# Patient Record
Sex: MALE | Race: WHITE | Employment: UNEMPLOYED | URBAN - METROPOLITAN AREA
[De-identification: names, ages, dates, MRNs, and addresses within clinical notes are randomized per-mention and may not be internally consistent; named-entity substitution may affect disease eponyms.]

---

## 2020-06-17 ENCOUNTER — HOSPITAL ENCOUNTER (OUTPATIENT)
Age: 20
Discharge: HOME | End: 2020-06-17
Payer: COMMERCIAL

## 2020-06-17 LAB
HBV SURFACE AB TITR SER: <3.5 MIU/ML
RUBELLA ANTIBODY IGG: 89.9 IU/ML

## 2020-06-17 PROCEDURE — 86735 MUMPS ANTIBODY: CPT

## 2020-06-17 PROCEDURE — 86706 HEP B SURFACE ANTIBODY: CPT

## 2020-06-17 PROCEDURE — 86787 VARICELLA-ZOSTER ANTIBODY: CPT

## 2020-06-17 PROCEDURE — 86762 RUBELLA ANTIBODY: CPT

## 2020-06-17 PROCEDURE — 86765 RUBEOLA ANTIBODY: CPT

## 2020-06-17 PROCEDURE — 36415 COLL VENOUS BLD VENIPUNCTURE: CPT

## 2020-06-18 LAB
MEASLES IMMUNE (IGG): NORMAL
MUMPS AB IGG: NORMAL
VARICELLA-ZOSTER VIRUS AB, IGG: NORMAL

## 2021-06-07 ENCOUNTER — OFFICE VISIT (OUTPATIENT)
Dept: ORTHOPEDIC SURGERY | Age: 21
End: 2021-06-07
Payer: COMMERCIAL

## 2021-06-07 VITALS — WEIGHT: 208 LBS | HEIGHT: 72 IN | BODY MASS INDEX: 28.17 KG/M2

## 2021-06-07 DIAGNOSIS — S83.242A TEAR OF MEDIAL MENISCUS OF LEFT KNEE, UNSPECIFIED TEAR TYPE, UNSPECIFIED WHETHER OLD OR CURRENT TEAR, INITIAL ENCOUNTER: Primary | ICD-10-CM

## 2021-06-07 DIAGNOSIS — M25.562 ACUTE PAIN OF LEFT KNEE: ICD-10-CM

## 2021-06-07 PROCEDURE — 99203 OFFICE O/P NEW LOW 30 MIN: CPT | Performed by: PHYSICIAN ASSISTANT

## 2021-06-07 RX ORDER — IBUPROFEN 200 MG
200 TABLET ORAL EVERY 6 HOURS PRN
COMMUNITY

## 2021-06-07 NOTE — PROGRESS NOTES
Patient Name:David Melgar  Medical Record Number: [id number]  YOB: 2000  Date of Encounter: 6/7/2021     Chief Complaint   Patient presents with    Knee Pain     Left knee pain x3 weeks. No known injury. History of Present Illness:  Oneil Gallegos is a 21 y.o. male here for evaluation of his left knee. His pain assessment is documented below and I reviewed this with him today. Patient states he began having left knee pain 3 weeks ago without any specific injury. He has been trying to become more active by getting out and walking more. He has also been doing some \"long boarding\". Patient states he injured his left knee while playing football his freshman year of high school and did not seek medical attention. He really did not have any left knee problems following that. He denies having any left knee swelling, redness or warmth. He denies locking or catching. He does have moderate grinding behind his kneecap. Most of his pain today is along the posterior aspect of his knee. Pain Assessment  Location of Pain: Knee  Location Modifiers: Left  Severity of Pain: 7  Quality of Pain: Other (Comment), Cracking (Stabbing)  Duration of Pain: Persistent  Frequency of Pain: Intermittent  Date Pain First Started:  (x3 weeks)  Aggravating Factors: Walking  Limiting Behavior: Yes  Relieving Factors: Rest, Nsaids  Result of Injury: No  Work-Related Injury: No  Are there other pain locations you wish to document?: No    History reviewed. No pertinent past medical history. Past Surgical History:   Procedure Laterality Date    ADENOIDECTOMY      TYMPANOSTOMY TUBE PLACEMENT  x5       Current Outpatient Medications   Medication Sig Dispense Refill    ibuprofen (ADVIL;MOTRIN) 200 MG tablet Take 200 mg by mouth every 6 hours as needed for Pain      fluticasone (FLONASE) 50 MCG/ACT nasal spray 2 sprays in each nostril daily.  (Patient not taking: Reported on 6/7/2021) 1 Bottle 2    fexofenadine (ALLEGRA) 180 MG tablet Take 1 tablet by mouth daily (Patient not taking: Reported on 6/7/2021) 30 tablet 2     No current facility-administered medications for this visit. Allergies, social and family histories were reviewed and updated as appropriate. Review of Systems:  Relevant review of systems reviewed and available in the patient's chart under the 'MEDIA' tab. Vital Signs:  Ht 6' (1.829 m)   Wt 208 lb (94.3 kg)   BMI 28.21 kg/m²     General Exam:   Constitutional: Patient is adequately groomed with no evidence of malnutrition  Mental Status: The patient is oriented to time, place and person. The patient's mood and affect are appropriate. Lymphatic: The lymphatic examination bilaterally reveals all areas to be without enlargement or induration. Neurological: The patient has good coordination and balance. There is no focal weakness or sensory deficit. Left Knee Examination:    Inspection: Normal muscle contours and no significant limb length discrepancy. No gross atrophy in any particular myotome. There is no obvious swelling or joint effusion of the knee. There are no abrasions, lacerations, contusions, hematomas or ecchymosis. There is no erythema, induration or warmth to suggest an infectious process. Palpation: Patient denies tenderness on palpation of the left knee. He does have a moderate patellofemoral crepitus. Range of Motion:    Flexion: 140°   Extension: 0°    Strength:  Quad 5 / 5  ; Hamstrings 5 / 5. Gross motor to hip and ankle intact    Special Tests:    Lachman (ACL) - negative   Posterior Lachman (PCL) - negative    Anterior Drawer (ACL) - negative   Posterior Drawer (PCL) - negative   Pivot shift (ACL) - negative    Posterior sag (PCL) - negative   Amarjit's Test (Meniscus) - negative   Homans Test (Thrombophlebitis)- negative   Does not open to valgus or varus stress at 0 or 30° (MCL/LCL)    Skin: There are no rashes, ulcerations or lesions.  Sensation to light touch intact. Circulation: The limb is warm and well perfused. Distal pulses intact. Capillary refill is intact. Edema: none. Venous stasis changes: none. Gait: Patient with normal tandem gait without limp. Normal strides     Radiology:  X-rays obtained today and reviewed in office:  Views: 3 view left knee including AP, lateral and sunrise views  Impression: No evidence for acute fracture or subluxation / dislocation. There are no loose bodies appreciated. There is no evidence of tibiofemoral subluxation. Impression:  Encounter Diagnoses   Name Primary?  Tear of medial meniscus of left knee, unspecified tear type, unspecified whether old or current tear, initial encounter Yes    Acute pain of left knee        Office Procedures:  Orders Placed This Encounter   Procedures    XR KNEE BILATERAL STANDING     Standing Status:   Future     Number of Occurrences:   1     Standing Expiration Date:   6/7/2022     Order Specific Question:   Reason for exam:     Answer:   Knee Pain    XR KNEE LEFT (1-2 VIEWS)     2V Lt Knee     Standing Status:   Future     Number of Occurrences:   1     Standing Expiration Date:   6/7/2022     Order Specific Question:   Reason for exam:     Answer:   Knee Pain    External Referral To Physical Therapy     Referral Priority:   Routine     Referral Type:   Eval and Treat     Referral Reason:   Specialty Services Required     Requested Specialty:   Physical Therapy     Number of Visits Requested:   1       Treatment Plan:          I am unsure as to the exact etiology of patient's left knee pain. This very well could be an acute exacerbation of an old posterior horn medial meniscus tear. He has no left knee swelling or joint effusion. He has great range of motion and strength of the left knee. Special testing is negative including Amarjit's testing. I feel patient will benefit most from working with physical therapy at this time.   If left knee pain worsens or fails to improve with physical therapy he will likely need an MRI to further evaluate. Patient is advised to avoid high impact or strenuous activity at this time. He will follow-up after 4 to 6 weeks of physical therapy. David Braggmady Goldberg was informed of the results of any imaging. We discussed treatment options and a time was given to answer questions. A plan was proposed and Elli Pacheco understand and accepts this course of care. Electronically signed by nIga Dawkins PA-C on 3/1/2857  Board Certified [facility]    Please note that portions of this note were completed with a voice recognition program.  Efforts were made to edit the dictations but occasionally words are mis-transcribed.

## 2022-01-05 ENCOUNTER — OFFICE VISIT (OUTPATIENT)
Dept: ENT CLINIC | Age: 22
End: 2022-01-05
Payer: COMMERCIAL

## 2022-01-05 VITALS — SYSTOLIC BLOOD PRESSURE: 136 MMHG | DIASTOLIC BLOOD PRESSURE: 82 MMHG | HEART RATE: 101 BPM | TEMPERATURE: 97.1 F

## 2022-01-05 DIAGNOSIS — R07.0 THROAT PAIN IN ADULT: ICD-10-CM

## 2022-01-05 DIAGNOSIS — J02.9 ACUTE PHARYNGITIS, UNSPECIFIED ETIOLOGY: Primary | ICD-10-CM

## 2022-01-05 DIAGNOSIS — J03.90 ACUTE TONSILLITIS, UNSPECIFIED ETIOLOGY: ICD-10-CM

## 2022-01-05 PROCEDURE — 99204 OFFICE O/P NEW MOD 45 MIN: CPT | Performed by: OTOLARYNGOLOGY

## 2022-01-05 RX ORDER — HYDROCODONE BITARTRATE AND ACETAMINOPHEN 5; 325 MG/1; MG/1
1 TABLET ORAL EVERY 4 HOURS PRN
Qty: 36 TABLET | Refills: 0 | Status: SHIPPED | OUTPATIENT
Start: 2022-01-05 | End: 2022-01-11

## 2022-01-05 ASSESSMENT — ENCOUNTER SYMPTOMS
RHINORRHEA: 0
SORE THROAT: 1
SINUS PAIN: 0
VOICE CHANGE: 1
FACIAL SWELLING: 0
TROUBLE SWALLOWING: 1

## 2022-01-05 NOTE — PROGRESS NOTES
[facility]       \"NEW\"  PATIENT VISIT   (ESTABLISHED, who has not been seen by a [facility] Otolaryngologist in over 3 years)       PCP:  No primary care provider on file. REFERRED BY:   Self       CHIEF COMPLAINT  Chief Complaint   Patient presents with    Other     infected throat hard to breathe       HISTORY OF PRESENT ILLNESS       Apple Merida is a 24 y.o. male here for evaluation and treatment of sore throat and upper respiratory infection. He was here with his mother, Emmanuel. His mother reported that his throat is very infected with white spots. \"My throat is swollen, and it is hard to breathe, mostly at night when I'm laying flat. \"  He was evaluated by a telehealth [facility] and was prescribed Augmentin, about 48 hours ago. In the past, he has a had time with steroid therapy due to anxiety. Per mother: \"On Monday his throat looked like a regular strep throat. Then yesterday, he sent me a picture and looked like a peritonsillar abscess. So got into pcp last night and gave him some magic mouthwash. Helped slilghtly. Patient said \"it doesn't really help and today it looked even worse when I looked in there. Had a throat infection october 2021 and not in past  Past three years no strep throat, tonsillitis, no peritonsillar abscess. REVIEW OF SYSTEMS   Review of Systems   Constitutional: Positive for chills and fever. Negative for unexpected weight change. HENT: Positive for postnasal drip, sore throat, trouble swallowing and voice change. Negative for congestion, ear discharge, ear pain (a little in the left ear just today), facial swelling, hearing loss, mouth sores, nosebleeds, rhinorrhea, sinus pain and tinnitus. Neurological: Negative for dizziness and speech difficulty. PAST MEDICAL HISTORY    No past medical history on file.       Past Surgical History:   Procedure Laterality Date    ADENOIDECTOMY  TYMPANOSTOMY TUBE PLACEMENT  x5         EXAMINATION    Vitals:    01/05/22 1405   BP: 136/82   Site: Right Upper Arm   Position: Sitting   Cuff Size: Medium Adult   Pulse: 101   Temp: 97.1 °F (36.2 °C)   TempSrc: Temporal       Physical Exam  Vitals reviewed. Constitutional:       General: He is awake. He is not in acute distress. Appearance: Normal appearance. He is well-developed. He is not ill-appearing or toxic-appearing. HENT:      Head: Normocephalic and atraumatic. Salivary Glands: Right salivary gland is not diffusely enlarged or tender. Left salivary gland is not diffusely enlarged or tender. Right Ear: Tympanic membrane, ear canal and external ear normal.      Left Ear: Tympanic membrane, ear canal and external ear normal.      Nose: Mucosal edema, congestion and rhinorrhea present. No nasal deformity or septal deviation. Right Turbinates: Swollen. Not enlarged. Left Turbinates: Swollen. Not enlarged. Right Sinus: No maxillary sinus tenderness or frontal sinus tenderness. Left Sinus: No maxillary sinus tenderness or frontal sinus tenderness. Mouth/Throat:      Lips: Pink. No lesions. Mouth: Mucous membranes are moist. No oral lesions. Tongue: No lesions. Palate: No mass and lesions. Pharynx: Uvula midline. Pharyngeal swelling, oropharyngeal exudate, posterior oropharyngeal erythema and uvula swelling present. Tonsils: Tonsillar exudate present. No tonsillar abscesses. 3+ on the right. 3+ on the left. Comments: Tonsils were 3+ enlarged, erythematous and edematous, symmetric, and  with membrano-follicular purulent exudate. Oropharyngeal airway was patent. There was no trismus or hot potato voice and no evidence of peritonsillar abscess. Neck:      Thyroid: No thyroid mass, thyromegaly or thyroid tenderness. Trachea: No tracheal deviation. Comments: No cervical masses.   Musculoskeletal:      Cervical back: Normal range of motion and neck supple. Lymphadenopathy:      Cervical: Cervical adenopathy (bilateral tender anterior cervical lymphadenopathy) present. Neurological:      Mental Status: He is alert. Kelton Ware / Mortimer Debar / Nereyda Snowden was seen today for other. Diagnoses and all orders for this visit:    Acute pharyngitis, unspecified etiology  -     CBC WITH AUTO DIFFERENTIAL; Future  -     MONONUCLEOSIS SCREEN; Future  -     COVID-19  -     HYDROcodone-acetaminophen (NORCO) 5-325 MG per tablet; Take 1 tablet by mouth every 4 hours as needed for Pain for up to 6 days. Intended supply: 6 days. Take lowest dose possible to manage pain    Acute tonsillitis, unspecified etiology  -     CBC WITH AUTO DIFFERENTIAL; Future  -     MONONUCLEOSIS SCREEN; Future  -     COVID-19  -     HYDROcodone-acetaminophen (NORCO) 5-325 MG per tablet; Take 1 tablet by mouth every 4 hours as needed for Pain for up to 6 days. Intended supply: 6 days. Take lowest dose possible to manage pain    Throat pain in adult  -     HYDROcodone-acetaminophen (NORCO) 5-325 MG per tablet; Take 1 tablet by mouth every 4 hours as needed for Pain for up to 6 days. Intended supply: 6 days. Take lowest dose possible to manage pain    Other orders  -     COVID-19  -     COVID-19  -     COVID-19             RECOMMENDATIONS/PLAN      1. Acetaminophen (eg. Tylenol) or Ibuprofen (eg. Advil) (over the counter medications) as needed for pain. 2. Return if symptoms worsen or fail to improve and clear with treatment, or, for any further ENT or sinus problems or symptoms. Patient Instructions   RHINOSINUSITIS AND URI CARE  · You may use acetaminophen (eg. Tylenol) or Ibuprofen (eg. Advil) (over the counter medications) as needed for fever or pain. · Take Probiotic while you are taking antibiotics, to prevent diarrhea, stomach upset, pseudomembranous colitis, and C. difficile diarrhea.   This may be obtained at your pharmacy or [employer] store. Alternatively, you may eat one cup of yogurt with active or live cultures twice daily while taking the antibiotic. Continue for two to three days after completion of the antibiotic. · Use a 12 hour decongestant spray, 0.05% oxymetazoline (e.g. Afrin, Duration, 4-Way). Spray each nostril twice two times a day for 6 as needed. · Take one Mucinex-D (red orange box) maximum strength tablet each morning and one Mucinex (blue box) maximum strength tablet each evening, about 12 hours later, daily for the next ten days. Take only is approved by your PCP regarding high blood pressure. · A steam inhaler mask device may be helpful. These can be purchased at your pharmacy. · It may take several days to several weeks for your sinusitis to clear up. It is important to take all your medications as prescribed. Please continue your antibiotics as prescribed. · Please call the office if your condition worsens or if symptoms persist after treatment is completed.

## 2022-01-05 NOTE — PATIENT INSTRUCTIONS
RHINOSINUSITIS AND URI CARE  · You may use acetaminophen (eg. Tylenol) or Ibuprofen (eg. Advil) (over the counter medications) as needed for fever or pain. · Take Probiotic while you are taking antibiotics, to prevent diarrhea, stomach upset, pseudomembranous colitis, and C. difficile diarrhea. This may be obtained at your pharmacy or health food store. Alternatively, you may eat one cup of yogurt with active or live cultures twice daily while taking the antibiotic. Continue for two to three days after completion of the antibiotic. · Use a 12 hour decongestant spray, 0.05% oxymetazoline (e.g. Afrin, Duration, 4-Way). Spray each nostril twice two times a day for 6 as needed. · Take one Mucinex-D (red orange box) maximum strength tablet each morning and one Mucinex (blue box) maximum strength tablet each evening, about 12 hours later, daily for the next ten days. Take only is approved by your PCP regarding high blood pressure. · A steam inhaler mask device may be helpful. These can be purchased at your pharmacy. · It may take several days to several weeks for your sinusitis to clear up. It is important to take all your medications as prescribed. Please continue your antibiotics as prescribed.     · Please call the office if your condition worsens or if symptoms persist after treatment is completed.        ===================================================================      ADVERSE AND SIDE EFFECTS OF MEDICATIONS:    Please be aware of the following possible adverse reactions, side effects, and complications of the following medications, including, but not limited to: allergic reaction, interactions with other medications, nausea, headache, diarrhea, persistent symptoms, failure to improve, and the following:     Augmentin (antibiotic):  diarrhea, colitis (severe infection and inflammation of the large intestine), pseudomembranous colitis (severe infection and inflammation of the colon, usually due to C. difficile bacteria)  yeast or fungal  infections, West-Jurgen syndrome (very rare necrotic skin reaction with peeling of skin, blisters and arthritis), persistent symptoms/infection, and failure to improve.       ~~~>>> Also please read the medication information in this AVS and all information given to you by the pharmacist.

## 2022-01-07 ENCOUNTER — TELEPHONE (OUTPATIENT)
Dept: ENT CLINIC | Age: 22
End: 2022-01-07

## 2022-01-11 NOTE — TELEPHONE ENCOUNTER
Phone call to patient. Reached his voicemail and left a message informing him of the results of the CBC and monotest.  COVID testing was not done by the lab due to prioritization. I advised him I would try to call him later today, to check on his status.

## 2022-01-12 NOTE — TELEPHONE ENCOUNTER
Phone call. Spoke to patient. He stated that his throat is feeling 100% better and almost back to normal.  He asked about his low red cell count on his CBC. I also advised him of the elevated monocyte count. I advised him to follow-up on this with his primary care physician and suggested a repeat CBC in about 4 to 6 weeks. In addition I discussed the negative monotest and that this should be repeated if his symptoms continue after about a month. I also advised him that the COVID test was not done but that it is a moot point at this time since he is beyond the acute stage and feeling better. Advised him to call the office if he has any further problems with his tonsils or other ear nose or throat or sinus problems. I advised him that tonsillectomy would be indicated if he has a peritonsillar abscess with a history of recurrent acute tonsillitis or strep pharyngitis or has frequently recurrent strep throat or pharyngotonsillitis over the next year. He stated his understanding and that he will call if he has further tonsil or other ear nose or throat or sinus problems.

## 2023-03-29 ENCOUNTER — OFFICE VISIT (OUTPATIENT)
Dept: ENT CLINIC | Age: 23
End: 2023-03-29

## 2023-03-29 VITALS
TEMPERATURE: 98.9 F | BODY MASS INDEX: 27.9 KG/M2 | SYSTOLIC BLOOD PRESSURE: 119 MMHG | WEIGHT: 206 LBS | OXYGEN SATURATION: 98 % | DIASTOLIC BLOOD PRESSURE: 81 MMHG | HEIGHT: 72 IN | HEART RATE: 73 BPM

## 2023-03-29 DIAGNOSIS — J35.01 CHRONIC TONSILLITIS: Chronic | ICD-10-CM

## 2023-03-29 DIAGNOSIS — J35.1 CHRONIC TONSILLAR HYPERTROPHY: Primary | Chronic | ICD-10-CM

## 2023-03-29 RX ORDER — TRAZODONE HYDROCHLORIDE 50 MG/1
50 TABLET ORAL NIGHTLY
COMMUNITY
Start: 2023-01-19

## 2023-03-29 ASSESSMENT — ENCOUNTER SYMPTOMS
SORE THROAT: 0
RHINORRHEA: 0
SINUS PAIN: 0

## 2023-03-31 ENCOUNTER — TELEPHONE (OUTPATIENT)
Dept: ENT CLINIC | Age: 23
End: 2023-03-31

## 2023-03-31 NOTE — TELEPHONE ENCOUNTER
Scheduled surgery with patient for 5/5/2023 at 7:30 AM with an arrival time of 6 AM. Patient was informed to see PCP about 2 weeks before surgery.